# Patient Record
Sex: MALE | URBAN - METROPOLITAN AREA
[De-identification: names, ages, dates, MRNs, and addresses within clinical notes are randomized per-mention and may not be internally consistent; named-entity substitution may affect disease eponyms.]

---

## 2023-11-27 ENCOUNTER — OFFICE VISIT (OUTPATIENT)
Dept: URBAN - METROPOLITAN AREA CLINIC 126 | Facility: CLINIC | Age: 33
End: 2023-11-27

## 2023-12-11 ENCOUNTER — OFFICE VISIT (OUTPATIENT)
Dept: URBAN - METROPOLITAN AREA CLINIC 126 | Facility: CLINIC | Age: 33
End: 2023-12-11

## 2023-12-21 ENCOUNTER — OFFICE VISIT (OUTPATIENT)
Dept: URBAN - METROPOLITAN AREA CLINIC 126 | Facility: CLINIC | Age: 33
End: 2023-12-21
Payer: OTHER GOVERNMENT

## 2023-12-21 VITALS
SYSTOLIC BLOOD PRESSURE: 120 MMHG | HEIGHT: 73 IN | HEART RATE: 89 BPM | BODY MASS INDEX: 29.08 KG/M2 | TEMPERATURE: 97.7 F | DIASTOLIC BLOOD PRESSURE: 80 MMHG | WEIGHT: 219.4 LBS

## 2023-12-21 DIAGNOSIS — K58.1 IRRITABLE BOWEL SYNDROME WITH CONSTIPATION: ICD-10-CM

## 2023-12-21 PROBLEM — 440630006: Status: ACTIVE | Noted: 2023-12-21

## 2023-12-21 PROCEDURE — 99203 OFFICE O/P NEW LOW 30 MIN: CPT | Performed by: INTERNAL MEDICINE

## 2023-12-21 RX ORDER — DICYCLOMINE HYDROCHLORIDE 20 MG/1
1 TABLET TABLET ORAL THREE TIMES A DAY
Qty: 90 | Refills: 11 | OUTPATIENT
Start: 2023-12-21 | End: 2024-12-15

## 2023-12-21 NOTE — HPI-TODAY'S VISIT:
goes to bathroom  every 4 days  for yrs when goes on trip bloating constipation gas  fine otherwise  passes mucous at times   no nausea  no pain no family history

## 2023-12-21 NOTE — PHYSICAL EXAM RECTAL:
normal tone, no external hemorrhoids, no masses palpable, no red blood, Tenderness on ANUSHKA, Internal hemorrhoids present

## 2024-02-01 ENCOUNTER — OV EP (OUTPATIENT)
Dept: URBAN - METROPOLITAN AREA CLINIC 126 | Facility: CLINIC | Age: 34
End: 2024-02-01
Payer: OTHER GOVERNMENT

## 2024-02-01 VITALS
HEIGHT: 73 IN | TEMPERATURE: 97.7 F | DIASTOLIC BLOOD PRESSURE: 80 MMHG | HEART RATE: 85 BPM | SYSTOLIC BLOOD PRESSURE: 100 MMHG | BODY MASS INDEX: 28.73 KG/M2 | WEIGHT: 216.8 LBS

## 2024-02-01 DIAGNOSIS — K58.1 IRRITABLE BOWEL SYNDROME WITH CONSTIPATION: ICD-10-CM

## 2024-02-01 PROCEDURE — 99214 OFFICE O/P EST MOD 30 MIN: CPT | Performed by: INTERNAL MEDICINE

## 2024-02-01 RX ORDER — DICYCLOMINE HYDROCHLORIDE 20 MG/1
1 TABLET TABLET ORAL THREE TIMES A DAY
Qty: 90 | Refills: 11 | Status: ACTIVE | COMMUNITY
Start: 2023-12-21 | End: 2024-12-15

## 2024-02-01 NOTE — HPI-TODAY'S VISIT:
active duty   doing much better on fiber and dicyclo   will reduce to two a day if he can  doing well

## 2024-08-01 ENCOUNTER — DASHBOARD ENCOUNTERS (OUTPATIENT)
Age: 34
End: 2024-08-01

## 2024-08-02 ENCOUNTER — OFFICE VISIT (OUTPATIENT)
Dept: URBAN - METROPOLITAN AREA CLINIC 126 | Facility: CLINIC | Age: 34
End: 2024-08-02

## 2024-08-02 RX ORDER — ESZOPICLONE 3 MG/1
TABLET, FILM COATED ORAL
Qty: 90 TABLET | Status: ACTIVE | COMMUNITY

## 2024-08-02 RX ORDER — DICYCLOMINE HYDROCHLORIDE 20 MG/1
1 TABLET TABLET ORAL THREE TIMES A DAY
Qty: 90 | Refills: 11 | COMMUNITY
Start: 2023-12-21 | End: 2024-12-15

## 2024-08-02 RX ORDER — PREGABALIN CAPSULES, CV 100 MG/1
TAKE 1 CAPSULE BY MOUTH TWICE DAILY CAPSULE ORAL
Qty: 60 EACH | Refills: 2 | Status: ACTIVE | COMMUNITY

## 2024-08-02 RX ORDER — KETOCONAZOLE 20 MG/G
CREAM TOPICAL
Qty: 60 GRAM | Status: ACTIVE | COMMUNITY

## 2024-08-02 RX ORDER — TRAMADOL HYDROCHLORIDE 50 MG/1
TAKE 1 TABLET BY MOUTH EVERY 12 HOURS AS NEEDED FOR PAIN TABLET, COATED ORAL
Qty: 60 EACH | Refills: 0 | Status: ACTIVE | COMMUNITY